# Patient Record
Sex: FEMALE | Race: BLACK OR AFRICAN AMERICAN | NOT HISPANIC OR LATINO | URBAN - METROPOLITAN AREA
[De-identification: names, ages, dates, MRNs, and addresses within clinical notes are randomized per-mention and may not be internally consistent; named-entity substitution may affect disease eponyms.]

---

## 2017-04-16 ENCOUNTER — EMERGENCY (EMERGENCY)
Facility: HOSPITAL | Age: 62
LOS: 0 days | Discharge: HOME | End: 2017-04-16
Attending: EMERGENCY MEDICINE
Payer: COMMERCIAL

## 2017-04-16 VITALS
SYSTOLIC BLOOD PRESSURE: 135 MMHG | TEMPERATURE: 98 F | RESPIRATION RATE: 18 BRPM | DIASTOLIC BLOOD PRESSURE: 87 MMHG | OXYGEN SATURATION: 99 % | HEART RATE: 68 BPM

## 2017-04-16 VITALS
TEMPERATURE: 98 F | HEART RATE: 68 BPM | HEIGHT: 63 IN | SYSTOLIC BLOOD PRESSURE: 131 MMHG | RESPIRATION RATE: 16 BRPM | DIASTOLIC BLOOD PRESSURE: 67 MMHG | WEIGHT: 158.07 LBS | OXYGEN SATURATION: 100 %

## 2017-04-16 DIAGNOSIS — K63.5 POLYP OF COLON: ICD-10-CM

## 2017-04-16 DIAGNOSIS — Z88.8 ALLERGY STATUS TO OTHER DRUGS, MEDICAMENTS AND BIOLOGICAL SUBSTANCES STATUS: ICD-10-CM

## 2017-04-16 DIAGNOSIS — J06.9 ACUTE UPPER RESPIRATORY INFECTION, UNSPECIFIED: ICD-10-CM

## 2017-04-16 DIAGNOSIS — K59.00 CONSTIPATION, UNSPECIFIED: ICD-10-CM

## 2017-04-16 DIAGNOSIS — E78.00 PURE HYPERCHOLESTEROLEMIA, UNSPECIFIED: ICD-10-CM

## 2017-04-16 LAB
ALBUMIN SERPL ELPH-MCNC: 3.8 G/DL — SIGNIFICANT CHANGE UP (ref 3.3–5)
ALP SERPL-CCNC: 110 U/L — SIGNIFICANT CHANGE UP (ref 40–120)
ALT FLD-CCNC: 28 U/L — SIGNIFICANT CHANGE UP (ref 12–78)
ANION GAP SERPL CALC-SCNC: 7 MMOL/L — SIGNIFICANT CHANGE UP (ref 5–17)
APPEARANCE UR: CLEAR — SIGNIFICANT CHANGE UP
AST SERPL-CCNC: 28 U/L — SIGNIFICANT CHANGE UP (ref 15–37)
BASOPHILS # BLD AUTO: 0 K/UL — SIGNIFICANT CHANGE UP (ref 0–0.2)
BASOPHILS NFR BLD AUTO: 1.2 % — SIGNIFICANT CHANGE UP (ref 0–2)
BILIRUB SERPL-MCNC: 0.4 MG/DL — SIGNIFICANT CHANGE UP (ref 0.2–1.2)
BILIRUB UR-MCNC: NEGATIVE — SIGNIFICANT CHANGE UP
BUN SERPL-MCNC: 10 MG/DL — SIGNIFICANT CHANGE UP (ref 7–23)
CALCIUM SERPL-MCNC: 8.8 MG/DL — SIGNIFICANT CHANGE UP (ref 8.5–10.1)
CHLORIDE SERPL-SCNC: 105 MMOL/L — SIGNIFICANT CHANGE UP (ref 96–108)
CO2 SERPL-SCNC: 29 MMOL/L — SIGNIFICANT CHANGE UP (ref 22–31)
COLOR SPEC: YELLOW — SIGNIFICANT CHANGE UP
CREAT SERPL-MCNC: 0.86 MG/DL — SIGNIFICANT CHANGE UP (ref 0.5–1.3)
DIFF PNL FLD: NEGATIVE — SIGNIFICANT CHANGE UP
EOSINOPHIL # BLD AUTO: 0.1 K/UL — SIGNIFICANT CHANGE UP (ref 0–0.5)
EOSINOPHIL NFR BLD AUTO: 1.9 % — SIGNIFICANT CHANGE UP (ref 0–6)
GLUCOSE SERPL-MCNC: 90 MG/DL — SIGNIFICANT CHANGE UP (ref 70–99)
GLUCOSE UR QL: NEGATIVE MG/DL — SIGNIFICANT CHANGE UP
HCT VFR BLD CALC: 43.2 % — SIGNIFICANT CHANGE UP (ref 34.5–45)
HGB BLD-MCNC: 14.2 G/DL — SIGNIFICANT CHANGE UP (ref 11.5–15.5)
KETONES UR-MCNC: NEGATIVE — SIGNIFICANT CHANGE UP
LEUKOCYTE ESTERASE UR-ACNC: NEGATIVE — SIGNIFICANT CHANGE UP
LIDOCAIN IGE QN: 118 U/L — SIGNIFICANT CHANGE UP (ref 73–393)
LYMPHOCYTES # BLD AUTO: 1.3 K/UL — SIGNIFICANT CHANGE UP (ref 1–3.3)
LYMPHOCYTES # BLD AUTO: 30.6 % — SIGNIFICANT CHANGE UP (ref 13–44)
MCHC RBC-ENTMCNC: 27.9 PG — SIGNIFICANT CHANGE UP (ref 27–34)
MCHC RBC-ENTMCNC: 32.9 GM/DL — SIGNIFICANT CHANGE UP (ref 32–36)
MCV RBC AUTO: 84.8 FL — SIGNIFICANT CHANGE UP (ref 80–100)
MONOCYTES # BLD AUTO: 0.8 K/UL — SIGNIFICANT CHANGE UP (ref 0–0.9)
MONOCYTES NFR BLD AUTO: 18.8 % — HIGH (ref 2–14)
NEUTROPHILS # BLD AUTO: 2 K/UL — SIGNIFICANT CHANGE UP (ref 1.8–7.4)
NEUTROPHILS NFR BLD AUTO: 47.6 % — SIGNIFICANT CHANGE UP (ref 43–77)
NITRITE UR-MCNC: NEGATIVE — SIGNIFICANT CHANGE UP
PH UR: 7 — SIGNIFICANT CHANGE UP (ref 4.8–8)
PLATELET # BLD AUTO: 205 K/UL — SIGNIFICANT CHANGE UP (ref 150–400)
POTASSIUM SERPL-MCNC: 4.7 MMOL/L — SIGNIFICANT CHANGE UP (ref 3.5–5.3)
POTASSIUM SERPL-SCNC: 4.7 MMOL/L — SIGNIFICANT CHANGE UP (ref 3.5–5.3)
PROT SERPL-MCNC: 7.9 GM/DL — SIGNIFICANT CHANGE UP (ref 6–8.3)
PROT UR-MCNC: NEGATIVE MG/DL — SIGNIFICANT CHANGE UP
RBC # BLD: 5.09 M/UL — SIGNIFICANT CHANGE UP (ref 3.8–5.2)
RBC # FLD: 11.8 % — SIGNIFICANT CHANGE UP (ref 11–15)
SODIUM SERPL-SCNC: 141 MMOL/L — SIGNIFICANT CHANGE UP (ref 135–145)
SP GR SPEC: 1 — LOW (ref 1.01–1.02)
UROBILINOGEN FLD QL: NEGATIVE MG/DL — SIGNIFICANT CHANGE UP
WBC # BLD: 4.1 K/UL — SIGNIFICANT CHANGE UP (ref 3.8–10.5)
WBC # FLD AUTO: 4.1 K/UL — SIGNIFICANT CHANGE UP (ref 3.8–10.5)

## 2017-04-16 PROCEDURE — 74177 CT ABD & PELVIS W/CONTRAST: CPT | Mod: 26

## 2017-04-16 PROCEDURE — 99053 MED SERV 10PM-8AM 24 HR FAC: CPT

## 2017-04-16 PROCEDURE — 99285 EMERGENCY DEPT VISIT HI MDM: CPT | Mod: 25

## 2017-04-16 RX ORDER — SODIUM CHLORIDE 9 MG/ML
3 INJECTION INTRAMUSCULAR; INTRAVENOUS; SUBCUTANEOUS ONCE
Qty: 0 | Refills: 0 | Status: COMPLETED | OUTPATIENT
Start: 2017-04-16 | End: 2017-04-16

## 2017-04-16 RX ORDER — SODIUM CHLORIDE 9 MG/ML
1000 INJECTION INTRAMUSCULAR; INTRAVENOUS; SUBCUTANEOUS ONCE
Qty: 0 | Refills: 0 | Status: COMPLETED | OUTPATIENT
Start: 2017-04-16 | End: 2017-04-16

## 2017-04-16 RX ORDER — IOHEXOL 300 MG/ML
30 INJECTION, SOLUTION INTRAVENOUS ONCE
Qty: 0 | Refills: 0 | Status: COMPLETED | OUTPATIENT
Start: 2017-04-16 | End: 2017-04-16

## 2017-04-16 RX ADMIN — SODIUM CHLORIDE 3 MILLILITER(S): 9 INJECTION INTRAMUSCULAR; INTRAVENOUS; SUBCUTANEOUS at 09:34

## 2017-04-16 RX ADMIN — SODIUM CHLORIDE 1000 MILLILITER(S): 9 INJECTION INTRAMUSCULAR; INTRAVENOUS; SUBCUTANEOUS at 09:34

## 2017-04-16 RX ADMIN — IOHEXOL 30 MILLILITER(S): 300 INJECTION, SOLUTION INTRAVENOUS at 09:34

## 2017-04-16 NOTE — ED PROVIDER NOTE - OBJECTIVE STATEMENT
60yo female with pmh chronic constipation, presents with no BM x 5 days. Pt reports has had constipation throughout her life and gives herself enema, however now having crampy abd pain. no fever, n, v. + flatus. Pt also reports dry cough x 2 days, no fever, no sob, no cp.     ROS: No fever/chills. No photophobia/eye pain/changes in vision, No ear pain/sore throat/dysphagia, No chest pain/palpitations. No SOB/+cough/ no stridor. + abdominal pain, N/V/D, no black/bloody bm. No dysuria/frequency/discharge, No headache. No Dizziness.  No rash.  No numbness/tingling/weakness.

## 2017-04-16 NOTE — ED PROVIDER NOTE - MEDICAL DECISION MAKING DETAILS
Lab values do not require emergent intervention. CT scan demonstrates no acute pathology. Pt states she does not want enema, states she has her own medications. Discussed results and outcome of testing with the patient.  Patient given copy of available results. Patient advised to please follow up with their PMD within the next 24 hours and return to the Emergency Department for worsening symptoms or any other concerns.

## 2017-04-16 NOTE — ED ADULT NURSE NOTE - OBJECTIVE STATEMENT
received pt sitting on stretcher c/o pain to the Rt side of the abdomen x 2 months not getting better hx of polyps, last colonscopy 3 years ago, hx of constipation , denies any urinary symptoms, denies V/D/F/N.

## 2017-04-17 LAB
CULTURE RESULTS: SIGNIFICANT CHANGE UP
SPECIMEN SOURCE: SIGNIFICANT CHANGE UP

## 2017-05-18 ENCOUNTER — RESULT REVIEW (OUTPATIENT)
Age: 62
End: 2017-05-18

## 2017-05-18 ENCOUNTER — OUTPATIENT (OUTPATIENT)
Dept: OUTPATIENT SERVICES | Facility: HOSPITAL | Age: 62
LOS: 1 days | Discharge: HOME | End: 2017-05-18
Payer: COMMERCIAL

## 2017-05-18 VITALS
RESPIRATION RATE: 17 BRPM | TEMPERATURE: 98 F | OXYGEN SATURATION: 98 % | HEART RATE: 52 BPM | WEIGHT: 157.41 LBS | HEIGHT: 63 IN | DIASTOLIC BLOOD PRESSURE: 76 MMHG | SYSTOLIC BLOOD PRESSURE: 112 MMHG

## 2017-05-18 VITALS
SYSTOLIC BLOOD PRESSURE: 122 MMHG | RESPIRATION RATE: 16 BRPM | DIASTOLIC BLOOD PRESSURE: 65 MMHG | OXYGEN SATURATION: 100 % | HEART RATE: 53 BPM

## 2017-05-18 DIAGNOSIS — Z12.11 ENCOUNTER FOR SCREENING FOR MALIGNANT NEOPLASM OF COLON: ICD-10-CM

## 2017-05-18 DIAGNOSIS — K59.00 CONSTIPATION, UNSPECIFIED: ICD-10-CM

## 2017-05-18 PROCEDURE — 88305 TISSUE EXAM BY PATHOLOGIST: CPT | Mod: 26

## 2017-05-18 RX ORDER — SODIUM CHLORIDE 9 MG/ML
1000 INJECTION, SOLUTION INTRAVENOUS
Qty: 0 | Refills: 0 | Status: DISCONTINUED | OUTPATIENT
Start: 2017-05-18 | End: 2017-05-18

## 2017-05-18 RX ORDER — ROSUVASTATIN CALCIUM 5 MG/1
1 TABLET ORAL
Qty: 0 | Refills: 0 | COMMUNITY

## 2017-05-18 NOTE — ASU DISCHARGE PLAN (ADULT/PEDIATRIC). - INSTRUCTIONS
Please call Dr. Herbert's office at  to been seen in a follow up office visit two weeks after the completion of your procedure.

## 2017-05-19 LAB — SURGICAL PATHOLOGY FINAL REPORT - CH: SIGNIFICANT CHANGE UP

## 2017-05-24 DIAGNOSIS — Z12.11 ENCOUNTER FOR SCREENING FOR MALIGNANT NEOPLASM OF COLON: ICD-10-CM

## 2017-05-25 ENCOUNTER — APPOINTMENT (OUTPATIENT)
Dept: RADIOLOGY | Facility: HOSPITAL | Age: 62
End: 2017-05-25

## 2017-05-25 ENCOUNTER — OUTPATIENT (OUTPATIENT)
Dept: OUTPATIENT SERVICES | Facility: HOSPITAL | Age: 62
LOS: 1 days | Discharge: HOME | End: 2017-05-25
Payer: COMMERCIAL

## 2017-05-25 DIAGNOSIS — R13.10 DYSPHAGIA, UNSPECIFIED: ICD-10-CM

## 2017-05-25 PROCEDURE — 74270 X-RAY XM COLON 1CNTRST STD: CPT | Mod: 26

## 2018-01-30 ENCOUNTER — APPOINTMENT (OUTPATIENT)
Dept: CARDIOLOGY | Facility: CLINIC | Age: 63
End: 2018-01-30
Payer: MEDICAID

## 2018-01-30 ENCOUNTER — NON-APPOINTMENT (OUTPATIENT)
Age: 63
End: 2018-01-30

## 2018-01-30 VITALS
SYSTOLIC BLOOD PRESSURE: 120 MMHG | WEIGHT: 148 LBS | OXYGEN SATURATION: 98 % | HEIGHT: 63 IN | DIASTOLIC BLOOD PRESSURE: 70 MMHG | BODY MASS INDEX: 26.22 KG/M2 | HEART RATE: 45 BPM

## 2018-01-30 PROCEDURE — 99204 OFFICE O/P NEW MOD 45 MIN: CPT

## 2018-01-30 PROCEDURE — 93000 ELECTROCARDIOGRAM COMPLETE: CPT

## 2018-01-30 PROCEDURE — 93306 TTE W/DOPPLER COMPLETE: CPT

## 2018-04-18 ENCOUNTER — TRANSCRIPTION ENCOUNTER (OUTPATIENT)
Age: 63
End: 2018-04-18

## 2018-04-19 ENCOUNTER — RESULT REVIEW (OUTPATIENT)
Age: 63
End: 2018-04-19

## 2018-04-19 ENCOUNTER — OUTPATIENT (OUTPATIENT)
Dept: OUTPATIENT SERVICES | Facility: HOSPITAL | Age: 63
LOS: 1 days | Discharge: HOME | End: 2018-04-19
Payer: MEDICAID

## 2018-04-19 VITALS
HEIGHT: 63 IN | HEART RATE: 50 BPM | OXYGEN SATURATION: 100 % | DIASTOLIC BLOOD PRESSURE: 45 MMHG | SYSTOLIC BLOOD PRESSURE: 135 MMHG | RESPIRATION RATE: 18 BRPM | TEMPERATURE: 97 F | WEIGHT: 145.06 LBS

## 2018-04-19 VITALS
HEART RATE: 54 BPM | RESPIRATION RATE: 17 BRPM | DIASTOLIC BLOOD PRESSURE: 53 MMHG | OXYGEN SATURATION: 100 % | SYSTOLIC BLOOD PRESSURE: 128 MMHG

## 2018-04-19 PROCEDURE — 88312 SPECIAL STAINS GROUP 1: CPT | Mod: 26

## 2018-04-19 PROCEDURE — 88305 TISSUE EXAM BY PATHOLOGIST: CPT | Mod: 26

## 2018-04-19 RX ORDER — ROSUVASTATIN CALCIUM 5 MG/1
1 TABLET ORAL
Qty: 0 | Refills: 0 | COMMUNITY

## 2018-04-19 RX ORDER — SODIUM CHLORIDE 9 MG/ML
1000 INJECTION, SOLUTION INTRAVENOUS
Qty: 0 | Refills: 0 | Status: DISCONTINUED | OUTPATIENT
Start: 2018-04-19 | End: 2018-04-19

## 2018-04-19 RX ADMIN — SODIUM CHLORIDE 125 MILLILITER(S): 9 INJECTION, SOLUTION INTRAVENOUS at 10:46

## 2018-04-19 NOTE — ASU DISCHARGE PLAN (ADULT/PEDIATRIC). - INSTRUCTIONS
[place] diet for lunch. Resume previous diet for dinner. Please call Dr. Herbert's office at  to been seen in a follow up office visit two weeks after the completion of your procedure.

## 2018-04-20 LAB — SURGICAL PATHOLOGY FINAL REPORT - CH: SIGNIFICANT CHANGE UP

## 2018-04-23 DIAGNOSIS — B96.81 HELICOBACTER PYLORI [H. PYLORI] AS THE CAUSE OF DISEASES CLASSIFIED ELSEWHERE: ICD-10-CM

## 2018-04-23 DIAGNOSIS — R10.9 UNSPECIFIED ABDOMINAL PAIN: ICD-10-CM

## 2018-04-23 DIAGNOSIS — K29.50 UNSPECIFIED CHRONIC GASTRITIS WITHOUT BLEEDING: ICD-10-CM

## 2018-04-23 DIAGNOSIS — Z79.82 LONG TERM (CURRENT) USE OF ASPIRIN: ICD-10-CM

## 2018-04-23 DIAGNOSIS — K44.9 DIAPHRAGMATIC HERNIA WITHOUT OBSTRUCTION OR GANGRENE: ICD-10-CM

## 2019-03-25 PROBLEM — K63.5 POLYP OF COLON: Chronic | Status: ACTIVE | Noted: 2017-04-16

## 2019-04-04 ENCOUNTER — OUTPATIENT (OUTPATIENT)
Dept: OUTPATIENT SERVICES | Facility: HOSPITAL | Age: 64
LOS: 1 days | Discharge: HOME | End: 2019-04-04

## 2019-04-04 VITALS
HEIGHT: 63 IN | OXYGEN SATURATION: 100 % | HEART RATE: 51 BPM | DIASTOLIC BLOOD PRESSURE: 54 MMHG | WEIGHT: 160.06 LBS | RESPIRATION RATE: 18 BRPM | SYSTOLIC BLOOD PRESSURE: 113 MMHG | TEMPERATURE: 97 F

## 2019-04-04 VITALS
SYSTOLIC BLOOD PRESSURE: 128 MMHG | RESPIRATION RATE: 16 BRPM | DIASTOLIC BLOOD PRESSURE: 70 MMHG | OXYGEN SATURATION: 100 % | HEART RATE: 68 BPM

## 2019-04-04 RX ORDER — ASPIRIN/CALCIUM CARB/MAGNESIUM 324 MG
1 TABLET ORAL
Qty: 0 | Refills: 0 | COMMUNITY

## 2019-04-04 RX ORDER — ATORVASTATIN CALCIUM 80 MG/1
1 TABLET, FILM COATED ORAL
Qty: 0 | Refills: 0 | COMMUNITY

## 2019-04-08 DIAGNOSIS — K57.30 DIVERTICULOSIS OF LARGE INTESTINE WITHOUT PERFORATION OR ABSCESS WITHOUT BLEEDING: ICD-10-CM

## 2019-04-08 DIAGNOSIS — Z12.11 ENCOUNTER FOR SCREENING FOR MALIGNANT NEOPLASM OF COLON: ICD-10-CM

## 2019-04-08 DIAGNOSIS — E78.5 HYPERLIPIDEMIA, UNSPECIFIED: ICD-10-CM

## 2020-09-08 NOTE — ASU PREOP CHECKLIST - DENTURES
no Cyclophosphamide Pregnancy And Lactation Text: This medication is Pregnancy Category D and it isn't considered safe during pregnancy. This medication is excreted in breast milk.

## 2021-03-01 NOTE — ASU DISCHARGE PLAN (ADULT/PEDIATRIC). - MEDICATION SUMMARY - MEDICATIONS TO TAKE
Pt has annual exam scheduled for 3/9/2021. RN sent jessica refill to get pt to appt.    Opal Rosales RN on 3/1/2021 at 10:59 AM       I will START or STAY ON the medications listed below when I get home from the hospital:  None

## 2021-05-07 ENCOUNTER — EMERGENCY (EMERGENCY)
Facility: HOSPITAL | Age: 66
LOS: 0 days | Discharge: HOME | End: 2021-05-07
Attending: STUDENT IN AN ORGANIZED HEALTH CARE EDUCATION/TRAINING PROGRAM
Payer: MEDICAID

## 2021-05-07 VITALS
SYSTOLIC BLOOD PRESSURE: 126 MMHG | RESPIRATION RATE: 20 BRPM | OXYGEN SATURATION: 96 % | HEART RATE: 94 BPM | TEMPERATURE: 98 F | DIASTOLIC BLOOD PRESSURE: 89 MMHG

## 2021-05-07 VITALS
RESPIRATION RATE: 18 BRPM | DIASTOLIC BLOOD PRESSURE: 79 MMHG | HEART RATE: 56 BPM | HEIGHT: 63 IN | OXYGEN SATURATION: 97 % | TEMPERATURE: 98 F | WEIGHT: 179.9 LBS | SYSTOLIC BLOOD PRESSURE: 150 MMHG

## 2021-05-07 DIAGNOSIS — R10.30 LOWER ABDOMINAL PAIN, UNSPECIFIED: ICD-10-CM

## 2021-05-07 DIAGNOSIS — K59.00 CONSTIPATION, UNSPECIFIED: ICD-10-CM

## 2021-05-07 DIAGNOSIS — Z88.8 ALLERGY STATUS TO OTHER DRUGS, MEDICAMENTS AND BIOLOGICAL SUBSTANCES: ICD-10-CM

## 2021-05-07 LAB
ALBUMIN SERPL ELPH-MCNC: 3.9 G/DL — SIGNIFICANT CHANGE UP (ref 3.3–5)
ALP SERPL-CCNC: 135 U/L — HIGH (ref 40–120)
ALT FLD-CCNC: 38 U/L — SIGNIFICANT CHANGE UP (ref 12–78)
ANION GAP SERPL CALC-SCNC: 2 MMOL/L — LOW (ref 5–17)
APPEARANCE UR: CLEAR — SIGNIFICANT CHANGE UP
AST SERPL-CCNC: 26 U/L — SIGNIFICANT CHANGE UP (ref 15–37)
BASOPHILS # BLD AUTO: 0.04 K/UL — SIGNIFICANT CHANGE UP (ref 0–0.2)
BASOPHILS NFR BLD AUTO: 0.7 % — SIGNIFICANT CHANGE UP (ref 0–2)
BILIRUB SERPL-MCNC: 0.5 MG/DL — SIGNIFICANT CHANGE UP (ref 0.2–1.2)
BILIRUB UR-MCNC: NEGATIVE — SIGNIFICANT CHANGE UP
BUN SERPL-MCNC: 11 MG/DL — SIGNIFICANT CHANGE UP (ref 7–23)
CALCIUM SERPL-MCNC: 9.2 MG/DL — SIGNIFICANT CHANGE UP (ref 8.5–10.1)
CHLORIDE SERPL-SCNC: 108 MMOL/L — SIGNIFICANT CHANGE UP (ref 96–108)
CO2 SERPL-SCNC: 31 MMOL/L — SIGNIFICANT CHANGE UP (ref 22–31)
COLOR SPEC: YELLOW — SIGNIFICANT CHANGE UP
CREAT SERPL-MCNC: 0.78 MG/DL — SIGNIFICANT CHANGE UP (ref 0.5–1.3)
DIFF PNL FLD: NEGATIVE — SIGNIFICANT CHANGE UP
EOSINOPHIL # BLD AUTO: 0.38 K/UL — SIGNIFICANT CHANGE UP (ref 0–0.5)
EOSINOPHIL NFR BLD AUTO: 6.8 % — HIGH (ref 0–6)
GLUCOSE SERPL-MCNC: 101 MG/DL — HIGH (ref 70–99)
GLUCOSE UR QL: NEGATIVE MG/DL — SIGNIFICANT CHANGE UP
HCT VFR BLD CALC: 43.8 % — SIGNIFICANT CHANGE UP (ref 34.5–45)
HGB BLD-MCNC: 13.7 G/DL — SIGNIFICANT CHANGE UP (ref 11.5–15.5)
IMM GRANULOCYTES NFR BLD AUTO: 0.4 % — SIGNIFICANT CHANGE UP (ref 0–1.5)
KETONES UR-MCNC: NEGATIVE — SIGNIFICANT CHANGE UP
LEUKOCYTE ESTERASE UR-ACNC: NEGATIVE — SIGNIFICANT CHANGE UP
LYMPHOCYTES # BLD AUTO: 2.32 K/UL — SIGNIFICANT CHANGE UP (ref 1–3.3)
LYMPHOCYTES # BLD AUTO: 41.3 % — SIGNIFICANT CHANGE UP (ref 13–44)
MCHC RBC-ENTMCNC: 27.3 PG — SIGNIFICANT CHANGE UP (ref 27–34)
MCHC RBC-ENTMCNC: 31.3 GM/DL — LOW (ref 32–36)
MCV RBC AUTO: 87.3 FL — SIGNIFICANT CHANGE UP (ref 80–100)
MONOCYTES # BLD AUTO: 0.59 K/UL — SIGNIFICANT CHANGE UP (ref 0–0.9)
MONOCYTES NFR BLD AUTO: 10.5 % — SIGNIFICANT CHANGE UP (ref 2–14)
NEUTROPHILS # BLD AUTO: 2.27 K/UL — SIGNIFICANT CHANGE UP (ref 1.8–7.4)
NEUTROPHILS NFR BLD AUTO: 40.3 % — LOW (ref 43–77)
NITRITE UR-MCNC: NEGATIVE — SIGNIFICANT CHANGE UP
NRBC # BLD: 0 /100 WBCS — SIGNIFICANT CHANGE UP (ref 0–0)
PH UR: 6.5 — SIGNIFICANT CHANGE UP (ref 5–8)
PLATELET # BLD AUTO: 285 K/UL — SIGNIFICANT CHANGE UP (ref 150–400)
POTASSIUM SERPL-MCNC: 4.1 MMOL/L — SIGNIFICANT CHANGE UP (ref 3.5–5.3)
POTASSIUM SERPL-SCNC: 4.1 MMOL/L — SIGNIFICANT CHANGE UP (ref 3.5–5.3)
PROT SERPL-MCNC: 7.6 GM/DL — SIGNIFICANT CHANGE UP (ref 6–8.3)
PROT UR-MCNC: NEGATIVE MG/DL — SIGNIFICANT CHANGE UP
RBC # BLD: 5.02 M/UL — SIGNIFICANT CHANGE UP (ref 3.8–5.2)
RBC # FLD: 12.6 % — SIGNIFICANT CHANGE UP (ref 10.3–14.5)
SODIUM SERPL-SCNC: 141 MMOL/L — SIGNIFICANT CHANGE UP (ref 135–145)
SP GR SPEC: 1 — LOW (ref 1.01–1.02)
UROBILINOGEN FLD QL: NEGATIVE MG/DL — SIGNIFICANT CHANGE UP
WBC # BLD: 5.62 K/UL — SIGNIFICANT CHANGE UP (ref 3.8–10.5)
WBC # FLD AUTO: 5.62 K/UL — SIGNIFICANT CHANGE UP (ref 3.8–10.5)

## 2021-05-07 PROCEDURE — 74174 CTA ABD&PLVS W/CONTRAST: CPT | Mod: 26,MC

## 2021-05-07 PROCEDURE — 99285 EMERGENCY DEPT VISIT HI MDM: CPT

## 2021-05-07 RX ORDER — KETOROLAC TROMETHAMINE 30 MG/ML
15 SYRINGE (ML) INJECTION ONCE
Refills: 0 | Status: DISCONTINUED | OUTPATIENT
Start: 2021-05-07 | End: 2021-05-07

## 2021-05-07 RX ORDER — IBUPROFEN 200 MG
1 TABLET ORAL
Qty: 30 | Refills: 0
Start: 2021-05-07 | End: 2021-05-11

## 2021-05-07 RX ADMIN — Medication 15 MILLIGRAM(S): at 12:14

## 2021-05-07 NOTE — ED PROVIDER NOTE - OBJECTIVE STATEMENT
66yo F w/ PMH HLD pw intermittent pressure like lower abd pain x 5mo. Pt went to GYN for pain, had sono, reportedly negative, pt give 5 day course of abx and Motrin. Pt completed abx w/o change in symptoms. Pt reports Motrin aids w/ pain but pain recurs when medications wears off. Pt reports pain is worse w/ ambulation, however is present even on days w/o ambulation. + chronic constipation, however pt uses OTC laxatives w/ relief. Last BM 2 days ago. ROS otherwise neg: Denies F/C, CP, SOB, cough, back pain, N/V/D, dysuria, hematuria, vaginal bleeding or d/c.     PMH HLD, meds atorvastatin, allergy simvastatin, PSH none.

## 2021-05-07 NOTE — ED ADULT TRIAGE NOTE - CHIEF COMPLAINT QUOTE
pt complaining of lower abdominal pain and constipation. state last BM 2 days ago. states she has had symptoms on and off since January.

## 2021-05-07 NOTE — ED ADULT NURSE NOTE - OBJECTIVE STATEMENT
Received pt lying on bed 26 alert and oriented x4  c/o complaining of lower abdominal pain since January accompanied and constipation.  Pt state that she went to See gyn doctor but pain is still persistent , state last BM 2 days ago. states she has had symptoms on and off since January.

## 2021-05-07 NOTE — ED PROVIDER NOTE - PATIENT PORTAL LINK FT
You can access the FollowMyHealth Patient Portal offered by [facility] by registering at the following website: [email or web address]. By joining [facility]’s FollowMyHealth portal, you will also be able to view your health information using other applications (apps) compatible with our system.

## 2021-05-07 NOTE — ED PROVIDER NOTE - CLINICAL SUMMARY MEDICAL DECISION MAKING FREE TEXT BOX
64yo F w/ PMH HLD pw 5mo of intermittent low abd pain, worse w/ ambulation. AFVSS. Pt well appearing, in NAD, unremarkable physical exam. Plan: Obtain CBC, CMP, UA/C, CT AP. Re-eval. Labs w/o significant abnormalities, UA w/o evidence of infection, CT w/o acute pathology. On re-eval, resting comfortably. Stable for d/c home. Given script for Motrin. Recommend close outpatient f/u w/ PCP. Return signs and symptoms d/w pt. She understands and agrees w/ this plan.

## 2021-05-08 LAB
CULTURE RESULTS: SIGNIFICANT CHANGE UP
SPECIMEN SOURCE: SIGNIFICANT CHANGE UP

## 2022-03-30 ENCOUNTER — APPOINTMENT (OUTPATIENT)
Dept: CARDIOLOGY | Facility: CLINIC | Age: 67
End: 2022-03-30
Payer: MEDICARE

## 2022-03-30 ENCOUNTER — NON-APPOINTMENT (OUTPATIENT)
Age: 67
End: 2022-03-30

## 2022-03-30 VITALS
HEIGHT: 63 IN | DIASTOLIC BLOOD PRESSURE: 70 MMHG | BODY MASS INDEX: 31.89 KG/M2 | OXYGEN SATURATION: 98 % | SYSTOLIC BLOOD PRESSURE: 122 MMHG | WEIGHT: 180 LBS | HEART RATE: 54 BPM

## 2022-03-30 PROCEDURE — 99203 OFFICE O/P NEW LOW 30 MIN: CPT

## 2022-03-30 PROCEDURE — 93000 ELECTROCARDIOGRAM COMPLETE: CPT

## 2022-03-30 PROCEDURE — 93246 EXT ECG>7D<15D RECORDING: CPT

## 2022-03-30 RX ORDER — ROSUVASTATIN CALCIUM 10 MG/1
10 TABLET, FILM COATED ORAL DAILY
Qty: 90 | Refills: 0 | Status: DISCONTINUED | COMMUNITY
End: 2022-03-30

## 2022-03-30 NOTE — HISTORY OF PRESENT ILLNESS
[FreeTextEntry1] : 66-year-old  femaleseen in 2018 for evaluation of long-standing palpitations. Patient stated that she had previous evaluation last on several years ago by another cardiologist which included Holter monitoring, stress and echocardiogram, the results of which are unavailable. She states that the palpitations usually occur when lying down at night, last several seconds and are unaccompanied by chest pain, shortness of breath, diaphoresis or syncope. She has no exercise tolerance limitations, and denies any other constitutional symptoms at this time. She has no stated history of hypertension or diabetes. \par \par She is being treated for hyperlipidemia. No significant cardiac family history.

## 2022-03-30 NOTE — DISCUSSION/SUMMARY
[Rhythm Disorder] : rhythm disorder [Stable] : stable [Echocardiogram] : an echocardiogram [Patient] : the patient [With PCP] : with ~his/her~ primary care provider [FreeTextEntry1] : Asymptomatic bradycardia with  intermittent palpitations which are likely benign. Will place extended Holter monitor at this time. Requesting her labs from PCP. Patient states she has normal exercise tolerance and I see no indication for ischemia evaluation at this time.Emphasized need for increased physical activity.  A heart healthy diet and lifestyle was recommended including low-fat, low-cholesterol, low-salt foods with proper weight maintenance and better carbohydrate choices.\par \par Would also consider referral to pulmonary for sleep study as patient has snoring history which may be exacerbating her dysrhythmia and malaise/fatigue.

## 2022-10-19 ENCOUNTER — APPOINTMENT (OUTPATIENT)
Dept: ORTHOPEDIC SURGERY | Facility: CLINIC | Age: 67
End: 2022-10-19

## 2022-10-19 VITALS — WEIGHT: 180 LBS | HEIGHT: 63 IN | BODY MASS INDEX: 31.89 KG/M2

## 2022-10-19 DIAGNOSIS — M54.50 LOW BACK PAIN, UNSPECIFIED: ICD-10-CM

## 2022-10-19 PROCEDURE — 99204 OFFICE O/P NEW MOD 45 MIN: CPT

## 2022-10-19 PROCEDURE — 72100 X-RAY EXAM L-S SPINE 2/3 VWS: CPT

## 2022-10-19 NOTE — PHYSICAL EXAM
[de-identified] : 2 views AP and Lat of Lumbar Spine from [id number] . Read and dictated by Dr. Cecilio Engle Board Certified Orthopaedic surgeon 10/19/22 L4-S1 Facet arthropathy \par \par

## 2022-10-19 NOTE — HISTORY OF PRESENT ILLNESS
[de-identified] : Patient is a 67 year-old female who presents to the office today for initial evaluation of lower back pain.\par Pt has always had lower back pain but it has gotten progressively worse over the years\par No accident/injury\par Pt describes the nature of the pain as "achy"\par Laying down exacerbates;\par Pain radiates to R buttocks \par No numbness/tingling \par The pain is intermittent \par No LESI/LIDIA\par Currently participates in PT 2x per week; only offers temporary relief \par Pt has not seen a chiropractor\par Pt has not participated in acupuncture\par Pt has not seen a pain management doctor \par Takes ibuprofen PRN; offers relief \par No fever, chills, sweats, nausea or vomiting. No bowel or bladder dysfunction, no recent weight loss or gain, no night pain. This history is in addition to the intake form that I personally reviewed.\par \par

## 2022-10-19 NOTE — DISCUSSION/SUMMARY
[de-identified] : 68 yo female with L4-S1 Facet arthropathy , recommend MRI of L Spine , pain consult, f/u for image review. \par \par Diagnosis, prognosis, natural history and treatment was discussed with patient. Patient was advised if the following symptoms develop: chills, fever, \par loss of bladder control, bowel incontinence or urinary retention, numbness/tingling or weakness is present in upper or lower extremities, to go to the nearest emergency room. This may be a new clinical condition not present at the time of the patient visit  that may lead to paralysis and/or death, Patient advised if the above symptoms developed to also call the office immediately to inform us and to go to the nearest emergency room.\par \par

## 2022-10-26 ENCOUNTER — APPOINTMENT (OUTPATIENT)
Dept: CARDIOLOGY | Facility: CLINIC | Age: 67
End: 2022-10-26

## 2022-10-26 ENCOUNTER — NON-APPOINTMENT (OUTPATIENT)
Age: 67
End: 2022-10-26

## 2022-10-26 VITALS
BODY MASS INDEX: 31.54 KG/M2 | SYSTOLIC BLOOD PRESSURE: 120 MMHG | WEIGHT: 178 LBS | HEART RATE: 57 BPM | DIASTOLIC BLOOD PRESSURE: 72 MMHG | HEIGHT: 63 IN | OXYGEN SATURATION: 97 %

## 2022-10-26 PROCEDURE — 93000 ELECTROCARDIOGRAM COMPLETE: CPT

## 2022-10-26 PROCEDURE — 99213 OFFICE O/P EST LOW 20 MIN: CPT | Mod: 25

## 2022-10-26 NOTE — PHYSICAL EXAM
[Well Developed] : well developed [Well Nourished] : well nourished [No Acute Distress] : no acute distress [Normal Conjunctiva] : normal conjunctiva [No Carotid Bruit] : no carotid bruit [Normal Venous Pressure] : normal venous pressure [Normal S1, S2] : normal S1, S2 [No Murmur] : no murmur [No Rub] : no rub [No Gallop] : no gallop [Clear Lung Fields] : clear lung fields [Good Air Entry] : good air entry [No Respiratory Distress] : no respiratory distress  [Soft] : abdomen soft [Non Tender] : non-tender [No Masses/organomegaly] : no masses/organomegaly [Normal Bowel Sounds] : normal bowel sounds [Normal Gait] : normal gait [No Edema] : no edema [No Cyanosis] : no cyanosis [No Clubbing] : no clubbing [No Varicosities] : no varicosities [No Rash] : no rash [No Skin Lesions] : no skin lesions [No Focal Deficits] : no focal deficits [Moves all extremities] : moves all extremities [Normal Speech] : normal speech [Alert and Oriented] : alert and oriented [Normal memory] : normal memory

## 2022-10-26 NOTE — DISCUSSION/SUMMARY
[Rhythm Disorder] : rhythm disorder [Stable] : stable [Echocardiogram] : an echocardiogram [Patient] : the patient [With PCP] : with ~his/her~ primary care provider [EKG obtained to assist in diagnosis and management of assessed problem(s)] : EKG obtained to assist in diagnosis and management of assessed problem(s) [FreeTextEntry1] : Patient denies any significant palpitations, occasional skipped beat noticed over the course of her day and certainly no runs of dysrhythmia.  She has no other new complaints at this time.  She is currently managing her significant other who recently had a stroke and she is the primary caretaker.  She also has worsening lower back pain for which she is scheduled to have an MRI and physical therapy.  Patient told that if the dysrhythmia worsens that she should return sooner for follow-up.

## 2022-10-26 NOTE — HISTORY OF PRESENT ILLNESS
[FreeTextEntry1] : Generally healthy 67-year-old  female here for evaluation of long-standing palpitations. Patient states she had previous evaluation last on several years ago by another cardiologist which included Holter monitoring, stress and echocardiogram, the results of which are unavailable. She states that the palpitations usually occur when lying down at night, last several seconds and are unaccompanied by chest pain, shortness of breath, diaphoresis or syncope. She has no exercise tolerance limitations, and denies any other constitutional symptoms at this time. She has no stated history of hypertension or diabetes. She is being treated for hyperlipidemia. No significant cardiac family history.

## 2022-10-26 NOTE — CARDIOLOGY SUMMARY
[___] : [unfilled] [de-identified] : 10/26/22, Sinus Bradycardia \par WITHIN NORMAL LIMITS [de-identified] : 3/30/22, brief runs of symptomatic SVT up to 180's

## 2022-11-27 NOTE — ASU DISCHARGE PLAN (ADULT/PEDIATRIC) - "IF YOU OR YOUR GUARDIAN/FAMILY IS A SMOKER, IT IS IMPORTANT FOR YOUR HEALTH TO STOP SMOKING. PLEASE BE AWARE THAT SECOND HAND SMOKE IS ALSO HARMFUL."
PAST SURGICAL HISTORY:  History of knee replacement bilateral    History of mastectomy, left      Statement Selected

## 2022-12-02 ENCOUNTER — APPOINTMENT (OUTPATIENT)
Dept: ORTHOPEDIC SURGERY | Facility: CLINIC | Age: 67
End: 2022-12-02

## 2022-12-02 VITALS — BODY MASS INDEX: 31.54 KG/M2 | WEIGHT: 178 LBS | HEIGHT: 63 IN

## 2022-12-02 DIAGNOSIS — M48.07 SPINAL STENOSIS, LUMBOSACRAL REGION: ICD-10-CM

## 2022-12-02 PROCEDURE — 99214 OFFICE O/P EST MOD 30 MIN: CPT

## 2023-03-30 ENCOUNTER — APPOINTMENT (OUTPATIENT)
Dept: CARDIOLOGY | Facility: CLINIC | Age: 68
End: 2023-03-30

## 2023-04-05 ENCOUNTER — APPOINTMENT (OUTPATIENT)
Dept: CARDIOLOGY | Facility: CLINIC | Age: 68
End: 2023-04-05

## 2023-04-26 ENCOUNTER — NON-APPOINTMENT (OUTPATIENT)
Age: 68
End: 2023-04-26

## 2023-04-26 ENCOUNTER — APPOINTMENT (OUTPATIENT)
Dept: CARDIOLOGY | Facility: CLINIC | Age: 68
End: 2023-04-26
Payer: MEDICARE

## 2023-04-26 VITALS
BODY MASS INDEX: 31.01 KG/M2 | OXYGEN SATURATION: 98 % | HEART RATE: 55 BPM | DIASTOLIC BLOOD PRESSURE: 68 MMHG | HEIGHT: 63 IN | SYSTOLIC BLOOD PRESSURE: 122 MMHG | WEIGHT: 175 LBS

## 2023-04-26 PROCEDURE — 99213 OFFICE O/P EST LOW 20 MIN: CPT | Mod: 25

## 2023-04-26 PROCEDURE — 93000 ELECTROCARDIOGRAM COMPLETE: CPT

## 2023-04-26 RX ORDER — MELOXICAM 15 MG/1
15 TABLET ORAL
Qty: 30 | Refills: 1 | Status: DISCONTINUED | COMMUNITY
Start: 2022-12-02 | End: 2023-04-26

## 2023-04-26 NOTE — HISTORY OF PRESENT ILLNESS
[FreeTextEntry1] : Generally healthy 67-year-old  female followed for h/o long-standing palpitations. Patient states she had previous evaluation last on several years ago by another cardiologist which included Holter monitoring, stress and echocardiogram, the results of which are unavailable. She states that the palpitations usually occur when lying down at night, last several seconds and are unaccompanied by chest pain, shortness of breath, diaphoresis or syncope. She has no exercise tolerance limitations, and denies any other constitutional symptoms at this time. She has no stated history of hypertension or diabetes. She is being treated for hyperlipidemia. No significant cardiac family history.\par \par No interval changes since last visit. Still has c/o infrequent brief salvos of irreg heart beats.

## 2023-04-26 NOTE — DISCUSSION/SUMMARY
[Rhythm Disorder] : rhythm disorder [Stable] : stable [Echocardiogram] : an echocardiogram [Patient] : the patient [___ Year(s)] : in [unfilled] year(s) [FreeTextEntry1] : She is managing her significant other who recently had a stroke and she is the primary caretaker.  Patient with known paroxysmal SVTs in the setting of baseline bradycardia arrhythmia, precludes addition of AV sen blocking agents.  Strongly recommended consideration of ablation of her SVTs as it remains symptomatic after many years.  Patient still apprehensive about any interventions at this time.  Patient told that if the dysrhythmia worsens that she should return sooner for follow-up.

## 2023-04-26 NOTE — CARDIOLOGY SUMMARY
[de-identified] : 10/26/22, Sinus Bradycardia \par WITHIN NORMAL LIMITS [de-identified] : 3/30/22, brief runs of symptomatic SVT up to 180's [___] : [unfilled] [___] : [unfilled]

## 2023-07-26 NOTE — ASU PATIENT PROFILE, ADULT - TEACHING/LEARNING DEVELOPMENTAL CONSIDERATIONS
none Dutasteride Counseling: Dustasteride Counseling:  I discussed with the patient the risks of use of dutasteride including but not limited to decreased libido, decreased ejaculate volume, and gynecomastia. Women who can become pregnant should not handle medication.  All of the patient's questions and concerns were addressed. Dutasteride Male Counseling: Dustasteride Counseling:  I discussed with the patient the risks of use of dutasteride including but not limited to decreased libido, decreased ejaculate volume, and gynecomastia. Women who can become pregnant should not handle medication.  All of the patient's questions and concerns were addressed.

## 2023-09-27 NOTE — ED PROVIDER NOTE - PROGRESS NOTE
Pre-procedure Intake  If YES to any questions or NO to having a   Please complete laminated checklist and leave on the computer keyboard for Provider, verbally inform provider if able.    For SCS Trial, RFA's or any sedation procedure:  Have you been fasting? NA  If yes, for how long?     Are you taking any any blood thinners such as Coumadin, Warfarin, Jantoven, Pradaxa Xarelto, Eliquis, Edoxaban, Enoxaparin, Lovenox, Heparin, Arixtra, Fondaparinux, or Fragmin? OR Antiplatelet medication such as Plavix, Brilinta, or Effient?   Yes -   Other blood thinners-Warfarin  If yes, when did you take your last dose? 09/22/2023    Do you take aspirin?  Yes -   ASA  If cervical procedure, have you held aspirin for 6 days?   NA    Do you have any allergies to contrast dye, iodine, steroid and/or numbing medications?  NO    Are you currently taking antibiotics or have an active infection?  NO    Have you had a fever/elevated temperature within the past week? NO    Are you currently taking oral steroids? NO    Do you have a ? Yes    Are you pregnant or breastfeeding?  NO    Have you received the COVID-19 vaccine? Yes  If yes, was it your 1st, 2nd or only dose needed? 2nd dose and booster  Date of most recent vaccine: 01/06/2023    Notify provider and RNs if systolic BP >170, diastolic BP >100, P >100 or O2 sats < 90%     Noemi Francisco MA  [facility]    Stable.

## 2024-01-17 ENCOUNTER — APPOINTMENT (OUTPATIENT)
Dept: ORTHOPEDIC SURGERY | Facility: CLINIC | Age: 69
End: 2024-01-17
Payer: MEDICARE

## 2024-01-17 VITALS
DIASTOLIC BLOOD PRESSURE: 73 MMHG | HEIGHT: 63 IN | SYSTOLIC BLOOD PRESSURE: 114 MMHG | BODY MASS INDEX: 30.3 KG/M2 | OXYGEN SATURATION: 99 % | WEIGHT: 171 LBS | HEART RATE: 51 BPM

## 2024-01-17 DIAGNOSIS — M60.9 MYOSITIS, UNSPECIFIED: ICD-10-CM

## 2024-01-17 DIAGNOSIS — M79.10 MYALGIA, UNSPECIFIED SITE: ICD-10-CM

## 2024-01-17 DIAGNOSIS — M51.36 OTHER INTERVERTEBRAL DISC DEGENERATION, LUMBAR REGION: ICD-10-CM

## 2024-01-17 PROCEDURE — 99214 OFFICE O/P EST MOD 30 MIN: CPT | Mod: 25

## 2024-01-17 PROCEDURE — 72100 X-RAY EXAM L-S SPINE 2/3 VWS: CPT

## 2024-01-17 PROCEDURE — 20552 NJX 1/MLT TRIGGER POINT 1/2: CPT

## 2024-01-17 NOTE — PHYSICAL EXAM
[Normal] : Gait: normal [Summers's Sign] : negative Summers's sign [Pronator Drift] : negative pronator drift [SLR] : negative straight leg raise [de-identified] : 5 out of 5 motor strength, sensation is intact and symmetrical full range of motion flexion extension and rotation, no palpatory tenderness full range of motion of hips knees shoulders and elbows (all four extremities), no atrophy, negative straight leg raise, no pathological reflexes, no swelling, normal ambulation, no apparent distress skin is intact, no palpable lymph nodes, no upper or lower extremity instability, alert and oriented x3 and normal mood. Normal finger-to nose test. Right SI pain. [de-identified] : XR AP Lat Lumbar 01/17/2024-mild scoliosis and Lumbar degenerative disc disease-reviewed with patient.

## 2024-01-17 NOTE — DISCUSSION/SUMMARY
[de-identified] : Right sacroiliitis Pain 7/10. Discussed all options. Continue PT.  I offered an injection after all risks were explained including allergic reaction to an infection under sterile conditions 1 mg of Depo-Medrol and 2 cc of 1% Lidocaine without epinephrine was injected into the painful site. The patient tolerated the procedure well and received significant relief following the injection. (Right SI joint) If no better MRI lumbar. All options discussed including rest, medicine, home exercise, acupuncture, Chiropractic care, Physical Therapy, Pain management, and last resort surgery. All questions were answered, all alternatives discussed and the patient is in complete agreement with the treatment plan which the patient contributed to and discussed with me through the shared decision-making process. Follow-up appointment as instructed. Any issues and the patient will call or come in sooner. Follow-up appointment as instructed. Any issues and the patient will call or come in sooner.

## 2024-01-17 NOTE — ADDENDUM
[FreeTextEntry1] : This note was written by Kylie Diaz on 01/17/23 acting as scribe for Dr. Stephen Corey M.D. I, Stephen Corey MD, have read and attest that all the information, medical decision making, and discharge instructions within are true and accurate.

## 2024-01-17 NOTE — HISTORY OF PRESENT ILLNESS
[Stable] : stable [de-identified] : 68 year old female patient presents for an initial evaluation of lower back pain x 2 years. Denies injury. Denies radiation of pain down the legs. Denies numbness/tingling. Laying down aggravates the pain. Takes ibuprofen and tylenol for the pain which provides temporary relief. She has been participating with PT twice a week for the past year, which provides temporary relief. Denies RENEE. PMHx: denies significant medical history No fever chills sweats nausea vomiting no bowel or bladder dysfunction, no recent weight loss or gain no night pain. This history is in addition to the intake form that I personally reviewed.

## 2024-01-31 ENCOUNTER — NON-APPOINTMENT (OUTPATIENT)
Age: 69
End: 2024-01-31

## 2024-01-31 ENCOUNTER — APPOINTMENT (OUTPATIENT)
Dept: CARDIOLOGY | Facility: CLINIC | Age: 69
End: 2024-01-31
Payer: MEDICARE

## 2024-01-31 VITALS
OXYGEN SATURATION: 97 % | BODY MASS INDEX: 30.3 KG/M2 | SYSTOLIC BLOOD PRESSURE: 110 MMHG | HEIGHT: 63 IN | WEIGHT: 171 LBS | DIASTOLIC BLOOD PRESSURE: 60 MMHG | HEART RATE: 47 BPM

## 2024-01-31 DIAGNOSIS — R00.1 BRADYCARDIA, UNSPECIFIED: ICD-10-CM

## 2024-01-31 DIAGNOSIS — R00.2 PALPITATIONS: ICD-10-CM

## 2024-01-31 DIAGNOSIS — E78.5 HYPERLIPIDEMIA, UNSPECIFIED: ICD-10-CM

## 2024-01-31 PROCEDURE — 93000 ELECTROCARDIOGRAM COMPLETE: CPT

## 2024-01-31 PROCEDURE — 99214 OFFICE O/P EST MOD 30 MIN: CPT

## 2024-01-31 PROCEDURE — G2211 COMPLEX E/M VISIT ADD ON: CPT

## 2024-01-31 RX ORDER — ATORVASTATIN CALCIUM 10 MG/1
10 TABLET, FILM COATED ORAL DAILY
Qty: 90 | Refills: 3 | Status: ACTIVE | COMMUNITY

## 2024-01-31 NOTE — DISCUSSION/SUMMARY
[Rhythm Disorder] : rhythm disorder [Stable] : stable [Echocardiogram] : an echocardiogram [Patient] : the patient [___ Year(s)] : in [unfilled] year(s) [EKG obtained to assist in diagnosis and management of assessed problem(s)] : EKG obtained to assist in diagnosis and management of assessed problem(s) [FreeTextEntry1] : She is still managing her significant other who had a stroke and she is the primary caretaker.   Patient with known paroxysmal SVTs in the setting of baseline bradycardia arrhythmia, precludes addition of AV sen blocking agents.  Referred to EPS for consideration of ablation of her SVTs as it remains symptomatic after many years.  Patient still apprehensive about any interventions.    Also noted persistently elevated cholesterol despite Lipitor, but she has admittedly been non-compliant with meds at times. Emphasized importance of meds compliance and should take in AM if this will improve compliance. Scheduled for lipid recheck in 2-3 months with PCP.

## 2024-01-31 NOTE — HISTORY OF PRESENT ILLNESS
[FreeTextEntry1] : Generally healthy 68-year-old  female followed for h/o long-standing palpitations. Patient states she had previous evaluation last on several years ago by another cardiologist which included Holter monitoring, stress and echocardiogram, the results of which are unavailable. She states that the palpitations usually occur when lying down at night, last several seconds and are unaccompanied by chest pain, shortness of breath, diaphoresis or syncope. She has no exercise tolerance limitations, and denies any other constitutional symptoms at this time. She has no stated history of hypertension or diabetes. She is being treated for hyperlipidemia. No significant cardiac family history.  No interval changes since last visit. c/o worsening palpitations over past several weeks.

## 2024-01-31 NOTE — CARDIOLOGY SUMMARY
[___] : [unfilled] [de-identified] : 1/31/24, Sinus bradycardia (48/min) 10/26/22, Sinus Bradycardia  WITHIN NORMAL LIMITS [de-identified] : 3/30/22, brief runs of symptomatic SVT up to 180's

## 2025-03-20 ENCOUNTER — APPOINTMENT (OUTPATIENT)
Dept: CARDIOLOGY | Facility: CLINIC | Age: 70
End: 2025-03-20
Payer: MEDICARE

## 2025-03-20 ENCOUNTER — NON-APPOINTMENT (OUTPATIENT)
Age: 70
End: 2025-03-20

## 2025-03-20 VITALS
WEIGHT: 186 LBS | BODY MASS INDEX: 32.96 KG/M2 | DIASTOLIC BLOOD PRESSURE: 70 MMHG | HEART RATE: 50 BPM | OXYGEN SATURATION: 98 % | HEIGHT: 63 IN | SYSTOLIC BLOOD PRESSURE: 110 MMHG

## 2025-03-20 DIAGNOSIS — E78.5 HYPERLIPIDEMIA, UNSPECIFIED: ICD-10-CM

## 2025-03-20 DIAGNOSIS — R00.2 PALPITATIONS: ICD-10-CM

## 2025-03-20 DIAGNOSIS — I47.10 SUPRAVENTRICULAR TACHYCARDIA, UNSPECIFIED: ICD-10-CM

## 2025-03-20 PROCEDURE — 99214 OFFICE O/P EST MOD 30 MIN: CPT | Mod: 25

## 2025-03-20 PROCEDURE — 93000 ELECTROCARDIOGRAM COMPLETE: CPT

## 2025-03-20 RX ORDER — ATORVASTATIN CALCIUM 10 MG/1
10 TABLET, FILM COATED ORAL DAILY
Qty: 90 | Refills: 3 | Status: ACTIVE | COMMUNITY

## 2025-04-02 ENCOUNTER — APPOINTMENT (OUTPATIENT)
Dept: CARDIOLOGY | Facility: CLINIC | Age: 70
End: 2025-04-02
Payer: MEDICARE

## 2025-04-02 PROCEDURE — 93306 TTE W/DOPPLER COMPLETE: CPT

## 2025-04-08 ENCOUNTER — APPOINTMENT (OUTPATIENT)
Dept: CARDIOLOGY | Facility: CLINIC | Age: 70
End: 2025-04-08
Payer: MEDICARE

## 2025-04-08 PROCEDURE — 93015 CV STRESS TEST SUPVJ I&R: CPT

## 2025-04-21 ENCOUNTER — APPOINTMENT (OUTPATIENT)
Dept: CARDIOLOGY | Facility: CLINIC | Age: 70
End: 2025-04-21
Payer: MEDICARE

## 2025-04-21 VITALS
HEIGHT: 63 IN | SYSTOLIC BLOOD PRESSURE: 106 MMHG | DIASTOLIC BLOOD PRESSURE: 60 MMHG | HEART RATE: 59 BPM | BODY MASS INDEX: 33.31 KG/M2 | WEIGHT: 188 LBS | OXYGEN SATURATION: 98 %

## 2025-04-21 DIAGNOSIS — E78.5 HYPERLIPIDEMIA, UNSPECIFIED: ICD-10-CM

## 2025-04-21 DIAGNOSIS — R00.2 PALPITATIONS: ICD-10-CM

## 2025-04-21 PROCEDURE — 93000 ELECTROCARDIOGRAM COMPLETE: CPT

## 2025-04-21 PROCEDURE — 99214 OFFICE O/P EST MOD 30 MIN: CPT | Mod: 25
